# Patient Record
Sex: FEMALE | Race: WHITE | NOT HISPANIC OR LATINO | Employment: FULL TIME | URBAN - METROPOLITAN AREA
[De-identification: names, ages, dates, MRNs, and addresses within clinical notes are randomized per-mention and may not be internally consistent; named-entity substitution may affect disease eponyms.]

---

## 2023-12-28 PROBLEM — G56.01 CARPAL TUNNEL SYNDROME, RIGHT: Status: ACTIVE | Noted: 2018-04-20

## 2024-06-25 ENCOUNTER — TELEPHONE (OUTPATIENT)
Dept: OBSTETRICS AND GYNECOLOGY | Facility: CLINIC | Age: 34
End: 2024-06-25
Payer: MEDICARE

## 2024-06-25 DIAGNOSIS — Z78.9 USES BIRTH CONTROL: ICD-10-CM

## 2024-06-25 RX ORDER — ETONOGESTREL AND ETHINYL ESTRADIOL VAGINAL RING .015; .12 MG/D; MG/D
RING VAGINAL
Qty: 3 EACH | Refills: 0 | Status: SHIPPED | OUTPATIENT
Start: 2024-06-25

## 2024-06-25 RX ORDER — ETONOGESTREL AND ETHINYL ESTRADIOL VAGINAL RING .015; .12 MG/D; MG/D
RING VAGINAL
Qty: 1 EACH | Refills: 0 | Status: SHIPPED | OUTPATIENT
Start: 2024-06-25 | End: 2024-06-25 | Stop reason: SDUPTHER

## 2024-08-01 ENCOUNTER — APPOINTMENT (OUTPATIENT)
Dept: OBSTETRICS AND GYNECOLOGY | Facility: CLINIC | Age: 34
End: 2024-08-01
Payer: MEDICARE

## 2024-08-01 VITALS
HEIGHT: 60 IN | DIASTOLIC BLOOD PRESSURE: 78 MMHG | WEIGHT: 133.4 LBS | BODY MASS INDEX: 26.19 KG/M2 | SYSTOLIC BLOOD PRESSURE: 106 MMHG

## 2024-08-01 DIAGNOSIS — Z78.9 USES BIRTH CONTROL: ICD-10-CM

## 2024-08-01 DIAGNOSIS — Z01.419 WELL WOMAN EXAM WITH ROUTINE GYNECOLOGICAL EXAM: Primary | ICD-10-CM

## 2024-08-01 DIAGNOSIS — N89.8 VAGINAL DISCHARGE: ICD-10-CM

## 2024-08-01 PROCEDURE — 1036F TOBACCO NON-USER: CPT | Performed by: NURSE PRACTITIONER

## 2024-08-01 PROCEDURE — 99395 PREV VISIT EST AGE 18-39: CPT | Performed by: NURSE PRACTITIONER

## 2024-08-01 PROCEDURE — 3008F BODY MASS INDEX DOCD: CPT | Performed by: NURSE PRACTITIONER

## 2024-08-01 PROCEDURE — 87205 SMEAR GRAM STAIN: CPT

## 2024-08-01 RX ORDER — ETONOGESTREL AND ETHINYL ESTRADIOL VAGINAL RING .015; .12 MG/D; MG/D
RING VAGINAL
Qty: 3 EACH | Refills: 3 | Status: SHIPPED | OUTPATIENT
Start: 2024-08-01

## 2024-08-01 ASSESSMENT — ENCOUNTER SYMPTOMS
CARDIOVASCULAR NEGATIVE: 1
CONSTITUTIONAL NEGATIVE: 1
GASTROINTESTINAL NEGATIVE: 1
NEUROLOGICAL NEGATIVE: 1
ALLERGIC/IMMUNOLOGIC NEGATIVE: 1
MUSCULOSKELETAL NEGATIVE: 1
PSYCHIATRIC NEGATIVE: 1
EYES NEGATIVE: 1
ENDOCRINE NEGATIVE: 1
RESPIRATORY NEGATIVE: 1
HEMATOLOGIC/LYMPHATIC NEGATIVE: 1

## 2024-08-01 NOTE — PROGRESS NOTES
Chief Complaint    Annual Exam        HPI    ANNUAL    DECLINE CHAPERONE    PAP 2023 NEG / NEG  STD SCREEN 2019 NEG / NEG  MAMMO NEVER  DEXA NEVER  COLON NEVER  Last edited by Galian Ramirez MA on 2024  9:47 AM.         34 y.o.  female presents for annual well woman exam.   H/O PCOS. She is on the vaginal ring for management of irregular cycles. Doing well, and needs refills.   Patient's menstrual cycles are regular every 28 days, lasting 3 days. Denies abnormal bleeding.  She is sexually active with . Denies dyspareunia.   H/O tubal ligation.   She denies any breast concerns.   Remote h/o of abnormal pap in ~ with normal colposcopy. Paps normal since. Last pap: 2023 and was negative and negative HPV.  Denies pelvic pain.  Reports vaginal discharge.   Denies urinary or bowel concerns.   She reports vape use. Counseled cessation.   PMH: PCOS  Family h/o breast cancer: None  Family h/o GYN cancer: None  Family h/o colon cancer: None    /78   Ht 1.524 m (5')   Wt 60.5 kg (133 lb 6.4 oz)   LMP 2024 (Exact Date)   BMI 26.05 kg/m²      Current Outpatient Medications   Medication Instructions    etonogestreL-ethinyl estradioL (Haloette) 0.12-0.015 mg/24 hr vaginal ring insert 1 ring vaginally for 3 weeks REMOVE for 1 week and repeat        Review of Systems   Constitutional: Negative.    HENT: Negative.     Eyes: Negative.    Respiratory: Negative.     Cardiovascular: Negative.    Gastrointestinal: Negative.    Endocrine: Negative.    Genitourinary: Negative.    Musculoskeletal: Negative.    Skin: Negative.    Allergic/Immunologic: Negative.    Neurological: Negative.    Hematological: Negative.    Psychiatric/Behavioral: Negative.     All other systems reviewed and are negative.       Physical Exam  Constitutional:       Appearance: Normal appearance.   HENT:      Head: Normocephalic.      Nose: Nose normal.   Cardiovascular:      Rate and Rhythm: Normal rate and regular  rhythm.   Pulmonary:      Effort: Pulmonary effort is normal.      Breath sounds: Normal breath sounds.   Chest:   Breasts:     Right: Normal.      Left: Normal.   Abdominal:      General: Abdomen is flat.      Palpations: Abdomen is soft.   Genitourinary:     General: Normal vulva.      Vagina: Normal.      Cervix: Normal.      Uterus: Normal.       Adnexa: Right adnexa normal and left adnexa normal.      Rectum: Normal.   Musculoskeletal:         General: Normal range of motion.      Cervical back: Normal range of motion and neck supple.   Skin:     General: Skin is warm and dry.   Neurological:      Mental Status: She is alert.   Psychiatric:         Mood and Affect: Mood normal.         Behavior: Behavior normal.          Assessment/Plan:   1. Well woman exam with routine gynecological exam  -Pap test not collected today. Last pap was in 06/2023 and was negative and negative HPV. Guidelines discussed.   -Self breast awareness exams reviewed.  -Advised yearly well woman exams.   -Follow up sooner if needed.     2. Uses birth control  -Refills: etonogestreL-ethinyl estradioL (Haloette) 0.12-0.015 mg/24 hr vaginal ring; insert 1 ring vaginally for 3 weeks REMOVE for 1 week and repeat  Dispense: 3 each; Refill: 3     3. Vaginal discharge  -Collected: Vaginitis Gram Stain For Bacterial Vaginosis + Yeast

## 2024-08-02 LAB
CLUE CELLS VAG LPF-#/AREA: NORMAL /[LPF]
NUGENT SCORE: 0
YEAST VAG WET PREP-#/AREA: NORMAL

## 2024-10-03 ENCOUNTER — OFFICE VISIT (OUTPATIENT)
Dept: OBSTETRICS AND GYNECOLOGY | Facility: CLINIC | Age: 34
End: 2024-10-03
Payer: MEDICARE

## 2024-10-03 VITALS
WEIGHT: 133.2 LBS | HEIGHT: 60 IN | SYSTOLIC BLOOD PRESSURE: 100 MMHG | DIASTOLIC BLOOD PRESSURE: 64 MMHG | BODY MASS INDEX: 26.15 KG/M2

## 2024-10-03 DIAGNOSIS — N89.8 VAGINAL ITCHING: Primary | ICD-10-CM

## 2024-10-03 PROCEDURE — 87205 SMEAR GRAM STAIN: CPT

## 2024-10-03 PROCEDURE — 3008F BODY MASS INDEX DOCD: CPT | Performed by: NURSE PRACTITIONER

## 2024-10-03 PROCEDURE — 99213 OFFICE O/P EST LOW 20 MIN: CPT | Performed by: NURSE PRACTITIONER

## 2024-10-03 PROCEDURE — 1036F TOBACCO NON-USER: CPT | Performed by: NURSE PRACTITIONER

## 2024-10-03 RX ORDER — FLUCONAZOLE 150 MG/1
TABLET ORAL
Qty: 2 TABLET | Refills: 0 | Status: SHIPPED | OUTPATIENT
Start: 2024-10-03

## 2024-10-03 ASSESSMENT — ENCOUNTER SYMPTOMS
FEVER: 0
FREQUENCY: 0
CHILLS: 0
DYSURIA: 0

## 2024-10-03 NOTE — PROGRESS NOTES
HPI    PATIENT DECLINES CHAPERONE     PATIENT STATES SHE HAS AN INFECTION  PATIENT STATES HER VAGINAL AREA FEELS IRRITATED , NO DISCHARGE AND NO ODOR   PATIENT STATES THIS HAS BEEN GOING ON FOR A FEW DAYS.   Last edited by Galina Ramirez MA on 10/3/2024 11:18 AM.         34 y.o.  female who presents with complaints of vaginal symptoms.   Symptoms began 2 weeks ago.  Thought she had BV, took a couple of doses of leftover Flagyl. Then thought it was yeast infection, used Monistat 1-day. More burning, irritation after use. Symptoms did eventually start to improve until yesterday.   Reports itching and irritation. Worse with wiping. Denies vaginal discharge, or odor.    Denies fever, chills, pelvic pain, urinary symptoms.   She is sexually active with .  H/O tubal ligation. On NuvaRing for irregular cycles.   PMH: PCOS     /64   Ht 1.524 m (5')   Wt 60.4 kg (133 lb 3.2 oz)   LMP 2024 (Exact Date)   BMI 26.01 kg/m²      Current Outpatient Medications   Medication Instructions    etonogestreL-ethinyl estradioL (Haloette) 0.12-0.015 mg/24 hr vaginal ring insert 1 ring vaginally for 3 weeks REMOVE for 1 week and repeat        Review of Systems   Constitutional:  Negative for chills and fever.   Genitourinary:  Positive for vaginal pain. Negative for dysuria, frequency, genital sores, pelvic pain, urgency and vaginal discharge.   All other systems reviewed and are negative.       Physical Exam  Constitutional:       Appearance: Normal appearance.   HENT:      Head: Normocephalic.      Nose: Nose normal.   Pulmonary:      Effort: Pulmonary effort is normal.   Genitourinary:     Vagina: Vaginal discharge (Thick, white) present.      Cervix: Normal.      Comments: Labial minora erythema   Musculoskeletal:         General: Normal range of motion.      Cervical back: Normal range of motion and neck supple.   Neurological:      Mental Status: She is alert.   Psychiatric:         Mood and Affect: Mood  normal.         Behavior: Behavior normal.          Assessment/Plan:  1. Vaginal itching  -Collected: Vaginitis Gram Stain For Bacterial Vaginosis + Yeast  -Will notify of results.  -Vulvar skin care reviewed - no scented soaps or detergents, avoid dryer sheets, wear loose-fitting, cotton clothing, keep area dry.  -Rx sent: fluconazole (Diflucan) 150 mg tablet; Take 1 tablet by mouth once. Repeat in 3 days if symptoms have not resolved.  Dispense: 2 tablet; Refill: 0   -Follow up as needed.

## 2024-10-06 LAB
CLUE CELLS VAG LPF-#/AREA: ABNORMAL /[LPF]
NUGENT SCORE: 0
YEAST VAG WET PREP-#/AREA: PRESENT

## 2024-10-09 ENCOUNTER — TELEPHONE (OUTPATIENT)
Dept: OBSTETRICS AND GYNECOLOGY | Facility: CLINIC | Age: 34
End: 2024-10-09
Payer: MEDICARE

## 2024-10-09 DIAGNOSIS — B37.31 VAGINAL YEAST INFECTION: Primary | ICD-10-CM

## 2024-10-10 RX ORDER — FLUCONAZOLE 150 MG/1
TABLET ORAL
Qty: 2 TABLET | Refills: 0 | Status: SHIPPED | OUTPATIENT
Start: 2024-10-10

## 2024-11-05 ENCOUNTER — TELEPHONE (OUTPATIENT)
Dept: OBSTETRICS AND GYNECOLOGY | Facility: CLINIC | Age: 34
End: 2024-11-05
Payer: MEDICARE

## 2024-11-05 DIAGNOSIS — B37.31 VAGINAL YEAST INFECTION: Primary | ICD-10-CM

## 2024-11-05 RX ORDER — FLUCONAZOLE 150 MG/1
TABLET ORAL
Qty: 2 TABLET | Refills: 0 | Status: SHIPPED | OUTPATIENT
Start: 2024-11-05

## 2024-11-05 RX ORDER — NYSTATIN AND TRIAMCINOLONE ACETONIDE 100000; 1 [USP'U]/G; MG/G
OINTMENT TOPICAL 2 TIMES DAILY
Qty: 15 G | Refills: 0 | Status: SHIPPED | OUTPATIENT
Start: 2024-11-05

## 2024-11-05 NOTE — TELEPHONE ENCOUNTER
Pt called. Recurrence of yeast infection symptoms. Advised daily women's probiotic. Rx fluconazole and topical Mycolog cream sent.

## 2025-07-01 DIAGNOSIS — Z78.9 USES BIRTH CONTROL: ICD-10-CM

## 2025-07-01 NOTE — TELEPHONE ENCOUNTER
PATIENT REQUESTING REFILL HALOETTE VAGINAL BC RING.     PATIENT IS GETTING READY TO GO OUT OF TOWN. SHE WOULD LIKE REFILLED SOON.     LAST ANNUAL WITH ALANNAH  8/1/24

## 2025-07-02 RX ORDER — ETONOGESTREL AND ETHINYL ESTRADIOL VAGINAL RING .015; .12 MG/D; MG/D
RING VAGINAL
Qty: 3 EACH | Refills: 0 | Status: SHIPPED | OUTPATIENT
Start: 2025-07-02